# Patient Record
Sex: MALE | Race: WHITE | NOT HISPANIC OR LATINO | Employment: FULL TIME | URBAN - NONMETROPOLITAN AREA
[De-identification: names, ages, dates, MRNs, and addresses within clinical notes are randomized per-mention and may not be internally consistent; named-entity substitution may affect disease eponyms.]

---

## 2018-03-09 ENCOUNTER — OFFICE VISIT CONVERTED (OUTPATIENT)
Dept: FAMILY MEDICINE CLINIC | Age: 53
End: 2018-03-09
Attending: NURSE PRACTITIONER

## 2018-04-30 ENCOUNTER — OFFICE VISIT CONVERTED (OUTPATIENT)
Dept: FAMILY MEDICINE CLINIC | Age: 53
End: 2018-04-30
Attending: NURSE PRACTITIONER

## 2018-12-21 ENCOUNTER — OFFICE VISIT CONVERTED (OUTPATIENT)
Dept: FAMILY MEDICINE CLINIC | Age: 53
End: 2018-12-21
Attending: NURSE PRACTITIONER

## 2021-05-18 NOTE — PROGRESS NOTES
Magdaleno Pisano. 1965     Office/Outpatient Visit    Visit Date: Fri, Dec 21, 2018 08:27 am    Provider: Marjan Hernandez N.P. (Assistant: Terrie Dukes MA)    Location: [facility]        Electronically signed by Marjan Hernandez N.P. on  12/21/2018 09:55:45 AM                             SUBJECTIVE:        CC:     Mr. Pisano is a 53 year old White male.  This is a follow-up visit.  check up         HPI:         Patient complains of health checkup.  He cannot recall when he last had a physical exam.  His last ECG was managed by [facility] office.  Depression screening is positive for decreased ability to concentrate.  He is current with his Hep a at work last year immunization.      Smoking status: Former smoker         In regard to the hypertension, this was first diagnosed several years ago.  He is not using any nonpharmacologic treatment modalities.  His current cardiac medication regimen includes a diuretic, a calcium channel blocker, and an angiotensin receptor blocker.  Mr. Pisano does not check his blood pressure other than at his clinic appointments.  He is tolerating the medication well without side effects.  Compliance with treatment has been good.          Additionally, he presents with history of high cholesterol.  compliance with treatment has been good.  He specifically denies associated symptoms, including muscle pain and weakness.  managed typically by IVEL[facility] Tim cardiology         Additionally, he presents with history of back pain.  reason for visit: Other details: Magdaleno was recently sent to PT for his ongoing back pain - and reports that he is much better and is using the stretches that he has been taught and it solves much of his pain and problems - no current back pain complaints.          Regarding Alcohol use, Magdaleno typically drinks daily.  He drinks beer and drinks at least 6 per day.  He denies any symptoms when he quits drinking.  He does not have any desire to quit at  this time     ROS:     CONSTITUTIONAL:  Negative for chills, fatigue, fever and weight change.      EYES:  Positive for blurred vision ( bilaterally ) and has not had eye exam.      E/N/T:  Positive for diminished hearing and dentures upper and lower.   Negative for tooth pain.      CARDIOVASCULAR:  Positive for pedal edema.   Negative for chest pain, orthopnea or paroxysmal nocturnal dyspnea.      RESPIRATORY:  Negative for recent cough and dyspnea.      GASTROINTESTINAL:  Negative for abdominal pain, holly-colored stools, constipation and diarrhea.      GENITOURINARY:  Negative for change in urine stream.      MUSCULOSKELETAL:  Positive for back pain ( intermittent but improved after PT ).      INTEGUMENTARY:  Negative for atypical mole(s).      NEUROLOGICAL:  Negative for dizziness and headaches.      HEMATOLOGIC/LYMPHATIC:  Negative for easy bruising and excessive bleeding.      ALLERGIC/IMMUNOLOGIC:  Positive for seasonal allergies.      PSYCHIATRIC:  Negative for anxiety, crying spells, depression, feelings of stress, anhedonia, sadness, sleep disturbance and suicidal thoughts.          PMH/FMH/SH:     Last Reviewed on 2018 08:48 AM by Marjan Hernandez    Past Medical History:             PAST MEDICAL HISTORY             CURRENT MEDICAL PROVIDERS:    A primary care provider    Cardiologist: Roly Tim - annual exams         PREVENTIVE HEALTH MAINTENANCE             EVALUATION FOR AORTIC ANEURYSM: has never been done and has no medical need for one at this time     COLORECTAL CANCER SCREENING: The next colonoscopy is due  NOW     DENTAL CLEANING: has full dentures     EYE EXAM: unknown     Hepatitis C Medicare Screening: never (ordered 18)     PSA: has never been done and has no medical need for one at this time         Surgical History:         Procedures:    heart stent placed in          Family History:     Father: ;  COPD     Mother: Aortic Aneurysm     Brother(s): Gout      Sister(s): Breast Cancer     Paternal Grandmother: Breast Cancer         Social History:     Occupation: [employer]     Marital Status:      Children: 2 children         Tobacco/Alcohol/Supplements:     Last Reviewed on 12/21/2018 08:48 AM by Marjan Hernandez    Tobacco: He has a past history of cigarette smoking; quit date:  1998.          Alcohol: Frequency: Daily When he drinks, the average quantity of alcohol is 6 drinks.   He typically consumes beer.      Caffeine:  He admits to consuming caffeine via coffee ( 1 serving per day ) and soda ( 1 serving per day ).          Substance Abuse History:     Last Reviewed on 12/21/2018 08:48 AM by Marjan Hernandez    None         Mental Health History:     Last Reviewed on 12/21/2018 08:48 AM by Marjan Hernandez    NEGATIVE         Communicable Diseases (eg STDs):     Last Reviewed on 12/21/2018 08:48 AM by Marjan Hernandez    Reportable health conditions; NEGATIVE             Current Problems:     Last Reviewed on 12/21/2018 08:48 AM by Marjan Hernandez    Screening for cancer of colon     Back pain     High cholesterol     Gout, unspecified     Hypertension     Screening test for viral diseases - other     Alcohol abuse, continuous         Immunizations:     Fluzone Quadrivalent (3+ years) 12/21/2018     Influenza Virus Vaccine pf (adult) 11/15/2017         Allergies:     Last Reviewed on 12/21/2018 08:48 AM by Marjan Hernandez        Current Medications:     Last Reviewed on 12/21/2018 08:48 AM by Marjan Hernandez    Valsartan/Hydrochlorothiazide 160mg/12.5mg Tablet     Meloxicam 15mg Tablet 1 po QD with food     Aspirin (ASA) 81mg Tablets, Enteric Coated 1 tab daily     Atorvastatin Calcium 80mg Tablet 1 tab daily         OBJECTIVE:        Vitals:         Current: 12/21/2018 8:32:59 AM    Ht:  6 ft, 2 in;  Wt: 220.8 lbs;  BMI: 28.3    T: 98.3 F (oral);  BP: 163/81 mm Hg (left arm, sitting);  P: 62 bpm (left arm (BP Cuff), sitting);  sCr:  0.74 mg/dL;  GFR: 125.34        Repeat:     8:46:46 AM     BP:   138/82mm Hg (left arm, sitting)         Exams:     PHYSICAL EXAM:     GENERAL: Vitals recorded well developed, well nourished;  well groomed;  no apparent distress;     EYES: lids and lacrimal system are normal in appearance; extraocular movements intact; conjunctiva and cornea are normal; PERRLA;     E/N/T:  normal EACs, TMs, nasal/oral mucosa, teeth, gingiva, and oropharynx;     NECK: carotid exam is normal with good upstroke and no bruits;     RESPIRATORY: normal respiratory rate and pattern with no distress; normal breath sounds with no rales, rhonchi, wheezes or rubs;     CARDIOVASCULAR: normal rate; rhythm is regular;  trace pedal edema;     GASTROINTESTINAL: nontender, nondistended; no hepatosplenomegaly or masses; no bruits;     LYMPHATIC: no enlargement of cervical or facial nodes; no supraclavicular nodes;     SKIN:  no significant rashes or lesions; no suspicious moles;     MUSCULOSKELETAL:  Normal range of motion, strength and tone;     NEUROLOGICAL:  cranial nerves, motor and sensory function, reflexes, gait and coordination are all intact;     PSYCHIATRIC:  appropriate affect and demeanor; normal speech pattern; grossly normal memory;         Procedures:     Vaccination against other viral diseases, Influenza     1. Influenza, seasonal PF (children 3 years to adult): 0.5 ml unit dose given IM in the left upper arm; administered by  Regarding contraindications to an Influenza vaccine: Denies moderate/severe illness with/without fever; serious reaction to eggs, egg proteins, gentamicin, gelatin, arginine, neomycin or polymixin; serious reaction after recieving previous influenza vaccines; and history of Guillain-[place] Syndrome.              ASSESSMENT:           V70.0   Z00.00  Health checkup              DDx:     401.1   I10  Hypertension              DDx:     272.0   E78.2  High cholesterol              DDx:     724.5   M54.89  Back  pain              DDx:     305.01   F10.10  Alcohol abuse, continuous              DDx:     V73.89   Z11.59  Screening test for viral diseases - other              DDx:     V76.51   Z12.11  Screening for cancer of colon              DDx:     V04.81   Z23  Vaccination against other viral diseases, Influenza              DDx:         ORDERS:         Meds Prescribed:       Refill of: Meloxicam 15mg Tablet 1 tab PRN daily  #30 (Thirty) tablet(s) Refills: 2         Lab Orders:       48089  HTNLP - [facility] CMP AND LIPID: [id number], 01705  (Send-Out)         94220  B12FO - [facility] Vitamin B12 with Folate  (Send-Out)         47739  MG - [facility] Magnesium, Serum  (Send-Out)         98413  HPCAB - [facility] Hepatitis C antibody  (Send-Out)         95782  BDCB2 - [facility] CBC w/o diff  (Send-Out)           Procedures Ordered:       94432  Immunization administration; one vaccine  (In-House)         REFER  Referral to Specialist or Other Facility  (Send-Out)           Other Orders:       53907  Influenza virus vaccine, quadrivalent, split virus, preservative free 3 years of age & older  (In-House)           Depression screen negative  (In-House)         1101F  Pt screen for fall risk; document no falls in past year or only 1 fall w/o injury in past year (SOLOMON)  (In-House)           Negative EtOH screen  (In-House)                   PLAN:          Health checkup Will forward labs to [facility] office when available     MIPS PHQ-9 Depression Screening Completed form scanned and in chart; Total Score 3 Positive alcohol screen discussed minimal use, no concerns at this time.            Orders:         Depression screen negative  (In-House)         1101F  Pt screen for fall risk; document no falls in past year or only 1 fall w/o injury in past year (SOLOMON)  (In-House)           Negative EtOH screen  (In-House)             Patient Education Handouts:       Physical Exam 50-59 year, Male           Hypertension managed per Roly Tim  ok  to fill medication if needed until his follow up appt with them     LABORATORY:  Labs ordered to be performed today include HTN/Lipid Panel: CMP, Lipid.            Orders:       45771  HTNLP - [facility] CMP AND LIPID: [id number], 09697  (Send-Out)            High cholesterol Refills per Roly AMIN to fill if necessary until seen by them next month          Back pain           Prescriptions:       Refill of: Meloxicam 15mg Tablet 1 tab PRN daily  #30 (Thirty) tablet(s) Refills: 2          Alcohol abuse, continuous     LABORATORY:  Labs ordered to be performed today include B12 with Folate, CBC W/O DIFF, and Magnesium level.            Orders:       53425  B12FO - [facility] Vitamin B12 with Folate  (Send-Out)         58113  MG - [facility] Magnesium, Serum  (Send-Out)         11265  BDCB2 - [facility] CBC w/o diff  (Send-Out)            Screening test for viral diseases - other     LABORATORY:  Labs ordered to be performed today include Hepatitis C Antibody.            Orders:       28896  HPCAB - [facility] Hepatitis C antibody  (Send-Out)            Screening for cancer of colon         REFERRALS:  Referral initiated to a general surgeon ( Dr. Tomasz Irwin; a colonoscopy ).            Orders:       REFER  Referral to Specialist or Other Facility  (Send-Out)            Vaccination against other viral diseases, Influenza           Orders:       41075  Immunization administration; one vaccine  (In-House)         98886  Influenza virus vaccine, quadrivalent, split virus, preservative free 3 years of age & older  (In-House)               CHARGE CAPTURE:           Primary Diagnosis:     V70.0 Health checkup            Z00.00    Encounter for general adult medical examination without abnormal findings              Orders:          25331   Preventive medicine, established patient, age 40-64 years  (In-House)                Depression screen negative  (In-House)             1101F   Pt screen for fall risk; document no falls in past year or only 1  fall w/o injury in past year (SOLOMON)  (In-House)                Negative EtOH screen  (In-House)           401.1 Hypertension            I10    Essential (primary) hypertension              Orders:          53475 -25  Office/outpatient visit; established patient, level 3  (In-House)           272.0 High cholesterol            E78.2    Mixed hyperlipidemia    724.5 Back pain            M54.89    Other dorsalgia    305.01 Alcohol abuse, continuous            F10.10    Alcohol abuse, uncomplicated    V73.89 Screening test for viral diseases - other            Z11.59    Encounter for screening for other viral diseases    V76.51 Screening for cancer of colon            Z12.11    Encounter for screening for malignant neoplasm of colon    V04.81 Vaccination against other viral diseases, Influenza            Z23    Encounter for immunization              Orders:          08904   Immunization administration; one vaccine  (In-House)             02179   Influenza virus vaccine, quadrivalent, split virus, preservative free 3 years of age & older  (In-House)               ADDENDUMS:      ____________________________________    Addendum: 12/21/2018 10:00 AM - Mary Ann Santos         Visit Note Faxed to:        Tomasz Irwin  (General Surgery); Number [phone]

## 2021-05-18 NOTE — PROGRESS NOTES
Magdaleno Pisano 1965     Office/Outpatient Visit    Visit Date:  03:23 pm    Provider: Marjan Hernandez N.P. (Assistant: Emma Vincent)    Location: [facility]        Electronically signed by Marjan Hernandez N.P. on  2018 10:46:09 AM                             SUBJECTIVE:        CC:     Mr. Pisano is a 52 year old White male.  pain upper back R sided         HPI:         Upper back pain noted.  Reason for visit: Pain.  The discomfort is most prominent in the right, mid thoracic spine.  The pain does not radiate.  He characterizes it as constant, mild in severity, and cramping.  This is a chronic, but intermittent problem with an acute exacerbation.  The event which precipitated this pain was job-related repetitive lifting.  Aggravating factors contributing to the back pain may be job-related repetitive lifting with back strain.  Associated symptoms include stiffness.  He notes some pain relief with muscle relaxers.      ROS:     CONSTITUTIONAL:  Negative for chills, fatigue, fever and weight change.      CARDIOVASCULAR:  Negative for chest pain, orthopnea, paroxysmal nocturnal dyspnea and pedal edema.      RESPIRATORY:  Negative for dyspnea and cough.      GASTROINTESTINAL:  Negative for abdominal pain, heartburn, constipation, diarrhea, and stool changes.      MUSCULOSKELETAL:  Positive for back pain ( recurrent ).          PM/FM/:     Last Reviewed on 2017 09:48 AM by Marjan Hernandez    Past Medical History:             PAST MEDICAL HISTORY             CURRENT MEDICAL PROVIDERS:    Primary care provider ( Dr. Weldon )    Cardiologist: Roly Tim - annual exams         PREVENTIVE HEALTH MAINTENANCE             COLONOSCOPY: The next one is due  NOW     INFLUENZA VACCINE: has never been done declines     PSA: unknown         Surgical History:         Procedures:    heart stent placed in          Family History:     Father: ;  COPD     Mother:  Aortic Aneurysm     Brother(s): Gout     Sister(s): Breast Cancer     Paternal Grandmother: Breast Cancer         Social History:     Occupation: [employer]     Marital Status:      Children: 2 children         Tobacco/Alcohol/Supplements:     Last Reviewed on 4/30/2018 03:26 PM by Emma Vincent    Tobacco: He has a past history of cigarette smoking; quit date:  1998.          Alcohol: Frequency: Daily When he drinks, the average quantity of alcohol is 6 drinks.   He typically consumes beer.      Caffeine:  He admits to consuming caffeine via coffee ( 1 serving per day ) and soda ( 1 serving per day ).          Substance Abuse History:     Last Reviewed on 7/12/2017 09:48 AM by Marjan Hernandez    None         Mental Health History:     Last Reviewed on 7/12/2017 09:48 AM by Marjan Hernandez    NEGATIVE         Communicable Diseases (eg STDs):     Last Reviewed on 7/12/2017 09:48 AM by Marjan Hernandez    Reportable health conditions; NEGATIVE             Current Problems:     Last Reviewed on 5/01/2018 10:45 AM by Marjan Hernandez    Back pain     Microscopic hematuria     High cholesterol     Gout, unspecified     Hypertension     Upper back pain         Immunizations:     Influenza Virus Vaccine pf (adult) 11/15/2017         Allergies:     Last Reviewed on 4/30/2018 03:25 PM by Emma Vincent        Current Medications:     Last Reviewed on 4/30/2018 03:28 PM by Emma Vincent    Meloxicam 15mg Tablet 1 po QD with food     Amlodipine  2.5mg Tablet 1 tab daily     Aspirin (ASA) 81mg Tablets, Enteric Coated 1 tab daily     Atorvastatin Calcium 80mg Tablet 1 tab daily     Valsartan/Hydrochlorothiazide 160mg/12.5mg Tablet Take 1 tablet(s) by mouth daily         OBJECTIVE:        Vitals:         Current: 4/30/2018 3:25:25 PM    Ht:  6 ft, 2 in;  Wt: 226.3 lbs;  BMI: 29.1    T: 97.6 F (oral);  BP: 151/95 mm Hg (left arm, sitting);  P: 98 bpm (left arm (BP Cuff), sitting);  sCr: 0.74 mg/dL;  GFR:  128.09        Repeat:     3:31:44 PM     BP:   148/88mm Hg (left arm, sitting)         Exams:     PHYSICAL EXAM:     GENERAL: Vitals recorded well developed, well nourished;  well groomed;  no apparent distress;     NECK:  supple, full ROM; no thyromegaly; no carotid bruits;     RESPIRATORY: normal respiratory rate and pattern with no distress; normal breath sounds with no rales, rhonchi, wheezes or rubs;     CARDIOVASCULAR: normal rate; rhythm is regular;  normal S1; normal S2; no systolic murmur; no cyanosis; no edema;     GASTROINTESTINAL: nontender, nondistended; no hepatosplenomegaly or masses; no bruits;     MUSCULOSKELETAL: normal gait; normal range of motion of all major muscle groups; pain with range of motion in: back extension and left lateral flexion; tender to palpation over mid right thoracic area;     NEUROLOGICAL:  cranial nerves, motor and sensory function, reflexes, gait and coordination are all intact;     PSYCHIATRIC:  appropriate affect and demeanor; normal speech pattern; grossly normal memory;         ASSESSMENT:           724.1   M62.830  Upper back pain              DDx:         ORDERS:         Meds Prescribed:       Cyclobenzaprine HCl 10mg Tablet 1 tablet AT HS PRN  #20 (Twenty) tablet(s) Refills: 0       Refill of: Meloxicam 15mg Tablet 1 po QD with food  #30 (Thirty) tablet(s) Refills: 2         Procedures Ordered:       REFER  Referral to Specialist or Other Facility  (Send-Out)                   PLAN:          Upper back pain         REFERRALS:  Referral initiated to physical therapy ( [facility] ).            Prescriptions:       Cyclobenzaprine HCl 10mg Tablet 1 tablet AT HS PRN  #20 (Twenty) tablet(s) Refills: 0       Refill of: Meloxicam 15mg Tablet 1 po QD with food  #30 (Thirty) tablet(s) Refills: 2           Orders:       REFER  Referral to Specialist or Other Facility  (Send-Out)               CHARGE CAPTURE:           Primary Diagnosis:     724.1 Upper back pain            M62.830     Muscle spasm of back              Orders:          70773   Office/outpatient visit; established patient, level 2  (In-House)               ADDENDUMS:      ____________________________________    Addendum: 05/04/2018 12:40 JENNA - Marjan Hernandez         change office visit code to [id number]

## 2021-05-18 NOTE — PROGRESS NOTES
Magdaleno Pisano 1965     Office/Outpatient Visit    Visit Date: Fri, Mar 9, 2018 10:26 am    Provider: Marjan Hernandez N.P. (Assistant: Roxana Bo MA)    Location: [facility]        Electronically signed by Marjan Hernandez N.P. on  03/11/2018 08:27:44 PM                             SUBJECTIVE:        CC:     Mr. Pisano is a 52 year old White male.  Patient complains of sharp pain on the right side of his back after lifting heavy objects.          HPI:         Mr. Pisano presents with back pain.  The location is primarily in the lumbar spine.  It does not radiate.  He characterizes it as intermittent.  This is a chronic, but intermittent problem with an acute exacerbation.  He states that the current episode of pain started 4 days ago.  The event which precipitated this pain was bending over.  Aggravating factors contributing to the back pain may be lifting and bending over.  Associated symptoms include stiffness and radiating into his right buttock.  He denies fever, radicular arm pain, radicular leg pain, numbness in the arms, numbness in the legs, weakness in the arms or weakness of the legs.  He notes some pain relief with rest, ice, and heat.  The pain worsens with walking, back extension, and lying on his side.  Medical history is significant for previous back pain, no previous kidney stones.  He denies history of back surgery.      ROS:     CONSTITUTIONAL:  Negative for chills, fatigue and fever.      CARDIOVASCULAR:  Negative for chest pain and pedal edema.      RESPIRATORY:  Negative for recent cough and dyspnea.      GENITOURINARY:  Negative for dysuria, hematuria and change in urine stream.      MUSCULOSKELETAL:  Positive for back pain.      NEUROLOGICAL:  Negative for dizziness, fainting, headaches and paresthesias.          PM/FMH/SH:     Last Reviewed on 7/12/2017 09:48 AM by Marjan Hernandez    Past Medical History:             PAST MEDICAL HISTORY              CURRENT MEDICAL PROVIDERS:    Primary care provider ( Dr. Weldon )    Cardiologist: Roly Tim - annual exams         PREVENTIVE HEALTH MAINTENANCE             COLONOSCOPY: The next one is due  NOW     INFLUENZA VACCINE: has never been done declines     PSA: unknown         Surgical History:         Procedures:    heart stent placed in          Family History:     Father: ;  COPD     Mother: Aortic Aneurysm     Brother(s): Gout     Sister(s): Breast Cancer     Paternal Grandmother: Breast Cancer         Social History:     Occupation: Process machinery -      Marital Status:      Children: 2 children         Tobacco/Alcohol/Supplements:     Last Reviewed on 3/09/2018 10:28 AM by Roxana Bo    Tobacco: He has a past history of cigarette smoking; quit date:  .          Alcohol: Frequency: Daily When he drinks, the average quantity of alcohol is 6 drinks.   He typically consumes beer.      Caffeine:  He admits to consuming caffeine via coffee ( 1 serving per day ) and soda ( 1 serving per day ).          Substance Abuse History:     Last Reviewed on 2017 09:48 AM by Marjan Hernandez    None         Mental Health History:     Last Reviewed on 2017 09:48 AM by Marjan Hernandez    NEGATIVE         Communicable Diseases (eg STDs):     Last Reviewed on 2017 09:48 AM by Marjan Hernandez    Reportable health conditions; NEGATIVE             Current Problems:     Last Reviewed on 2017 09:48 AM by Marjan Hernandez    Back pain     Microscopic hematuria     High cholesterol     Gout, unspecified     Hypertension     Other abnormal laboratory result on blood         Immunizations:     Influenza Virus Vaccine pf (adult) 11/15/2017         Allergies:     Last Reviewed on 2017 09:54 AM by Roxana Bo        Current Medications:     Last Reviewed on 3/09/2018 10:30 AM by Roxana Bo    Meloxicam 15mg Tablet 1 po QD with food     Men's One A Day Multivitamin  Take one tablet once daily     Amlodipine  2.5mg Tablet 1 tab daily     Aspirin (ASA) 81mg Tablets, Enteric Coated 1 tab daily     Atorvastatin Calcium 80mg Tablet 1 tab daily     Full Spectrum CBD Tincture 1000mg Cannabidiol Oil prn     Lisinopril/Hydrochlorothiazide 20mg/12.5mg Tablet 1 bid         OBJECTIVE:        Vitals:         Current: 3/9/2018 10:28:01 AM    Ht:  6 ft, 2 in;  Wt: 225.6 lbs;  BMI: 29.0    T: 97.3 F (oral);  BP: 149/85 mm Hg (right arm, sitting);  P: 70 bpm (left arm (BP Cuff), sitting);  sCr: 0.74 mg/dL;  GFR: 127.92        Repeat:     10:50:28 AM     BP:   142/80mm Hg (left arm, sitting)         Exams:     PHYSICAL EXAM:     GENERAL: Vitals recorded well developed, well nourished;  no apparent distress;     NECK: range of motion is normal;     RESPIRATORY: normal appearance and symmetric expansion of chest wall; normal respiratory rate and pattern with no distress; normal breath sounds with no rales, rhonchi, wheezes or rubs;     CARDIOVASCULAR: normal rate; rhythm is regular;  no edema;     GASTROINTESTINAL: nontender; normal bowel sounds; no organomegaly;     LYMPHATIC: no enlargement of cervical or facial nodes; no supraclavicular nodes;     MUSCULOSKELETAL: normal gait; normal range of motion of all major muscle groups; pain with range of motion in: back extension and left lateral flexion;     NEUROLOGIC: mental status: alert and oriented x 3;     PSYCHIATRIC: appropriate affect and demeanor; normal speech pattern; normal thought and perception;         ASSESSMENT           724.5   M54.89  Back pain              DDx:         ORDERS:         Meds Prescribed:       Cyclobenzaprine HCl 10mg Tablet 1 tablet AT HS PRN  #20 (Twenty) tablet(s) Refills: 0         Lab Orders:       [id number]  [facility] Urinalysis, automated, with micro  (Send-Out; Stat)                   PLAN:          Back pain     LABORATORY:  Labs ordered to be performed today include urinalysis with micro.      RECOMMENDATIONS  given include: no evidence of stones with todays labs, I do feel this is probably a pulled muscle - I would recommend that he rest his back over the next few weeks  take the mobic as prescribed and the muscle relaxers - TAKE WITH CAUTION as may cause drowsiness-  RTO if no better in 6-8 weeks.            Prescriptions:       Cyclobenzaprine HCl 10mg Tablet 1 tablet AT HS PRN  #20 (Twenty) tablet(s) Refills: 0           Orders:       [id number]  [facility] Urinalysis, automated, with micro  (Send-Out; Stat)               Patient Recommendations:        For  Back pain:     I also recommend no evidence of stones with todays labs, I do feel this is probably a pulled muscle - I would recommend that he rest his back over the next few weeks  take the mobic as prescribed and the muscle relaxers - TAKE WITH CAUTION as may cause drowsiness-  RTO if no better in 6-8 weeks.              CHARGE CAPTURE           **Please note: ICD descriptions below are intended for billing purposes only and may not represent clinical diagnoses**        Primary Diagnosis:         724.5 Back pain            M54.89    Other dorsalgia              Orders:          [id number]   Office/outpatient visit; established patient, level 3  (In-House)

## 2021-07-01 VITALS
TEMPERATURE: 97.6 F | BODY MASS INDEX: 29.04 KG/M2 | DIASTOLIC BLOOD PRESSURE: 88 MMHG | SYSTOLIC BLOOD PRESSURE: 148 MMHG | HEIGHT: 74 IN | WEIGHT: 226.3 LBS | HEART RATE: 98 BPM

## 2021-07-01 VITALS
HEART RATE: 70 BPM | DIASTOLIC BLOOD PRESSURE: 80 MMHG | TEMPERATURE: 97.3 F | BODY MASS INDEX: 28.95 KG/M2 | WEIGHT: 225.6 LBS | SYSTOLIC BLOOD PRESSURE: 142 MMHG | HEIGHT: 74 IN

## 2021-07-01 VITALS
DIASTOLIC BLOOD PRESSURE: 82 MMHG | WEIGHT: 220.8 LBS | SYSTOLIC BLOOD PRESSURE: 138 MMHG | BODY MASS INDEX: 28.34 KG/M2 | TEMPERATURE: 98.3 F | HEIGHT: 74 IN | HEART RATE: 62 BPM

## 2021-12-07 RX ORDER — MELOXICAM 15 MG/1
TABLET ORAL
Qty: 30 TABLET | Refills: 2 | OUTPATIENT
Start: 2021-12-07

## 2022-01-24 ENCOUNTER — TELEPHONE (OUTPATIENT)
Dept: PEDIATRICS | Facility: OTHER | Age: 57
End: 2022-01-24

## 2022-01-24 NOTE — TELEPHONE ENCOUNTER
Caller: Ana Pisano    Relationship to patient: Emergency Contact    Best call back number: [phone]    Chief complaint: RIB PAIN- BROKEN RIB    Patient directed to call 911 or go to their nearest emergency room.     Patient verbalized understanding: [x] Yes  [] No  If no, why?    Additional notes:THE PATIENT'S WIFE STATED THE PATIENT FELL ON 01/22/22 AND BELIEVES HE HAS BROKEN HIS RIB. SHE WILL BE TAKING HIM TO [facility].

## 2022-01-31 ENCOUNTER — TELEPHONE (OUTPATIENT)
Dept: FAMILY MEDICINE CLINIC | Age: 57
End: 2022-01-31

## 2022-01-31 ENCOUNTER — OFFICE VISIT (OUTPATIENT)
Dept: FAMILY MEDICINE CLINIC | Age: 57
End: 2022-01-31

## 2022-01-31 VITALS
SYSTOLIC BLOOD PRESSURE: 153 MMHG | HEIGHT: 74 IN | HEART RATE: 73 BPM | BODY MASS INDEX: 29.34 KG/M2 | TEMPERATURE: 98 F | OXYGEN SATURATION: 97 % | DIASTOLIC BLOOD PRESSURE: 75 MMHG | WEIGHT: 228.6 LBS

## 2022-01-31 DIAGNOSIS — S22.41XA: Primary | ICD-10-CM

## 2022-01-31 DIAGNOSIS — I10 PRIMARY HYPERTENSION: ICD-10-CM

## 2022-01-31 PROBLEM — Z95.5 PRESENCE OF CORONARY ANGIOPLASTY IMPLANT AND GRAFT: Status: ACTIVE | Noted: 2017-11-27

## 2022-01-31 PROBLEM — S22.49XA RIB FRACTURES: Status: ACTIVE | Noted: 2022-01-31

## 2022-01-31 PROCEDURE — 99213 OFFICE O/P EST LOW 20 MIN: CPT | Performed by: NURSE PRACTITIONER

## 2022-01-31 RX ORDER — AMLODIPINE BESYLATE 5 MG/1
5 TABLET ORAL DAILY
COMMUNITY
End: 2022-03-04 | Stop reason: SDUPTHER

## 2022-01-31 RX ORDER — LOSARTAN POTASSIUM AND HYDROCHLOROTHIAZIDE 12.5; 1 MG/1; MG/1
1 TABLET ORAL DAILY
COMMUNITY
Start: 2021-12-17 | End: 2022-12-17

## 2022-01-31 RX ORDER — HYDROCHLOROTHIAZIDE 12.5 MG/1
12.5 TABLET ORAL DAILY
COMMUNITY

## 2022-01-31 NOTE — PROGRESS NOTES
Chief Complaint  Magdaleno Pisano presents to [facility] for Hospital Follow Up Visit (1/24 fractured ribs)    Subjective          Mr. Pisano presents for follow-up after hospital visit for fractured ribs after fall. Fall occurred last Saturday (12 days ago), had been moving furniture off of a truck at home and fell. Immediately felt pain, CXR showed fracture of two right ribs. Hospital did xray, gave him Mackay. Pt reports he is still taking the pain medication they prescribed him, takes it once daily. He has two pills left, then will transition to OTC anti-inflammatories. He reports pain is improved, but still difficult to get out of bed, up out of chair, etc.    BP elevated today, follows up with Roly Tim.  Unsure what medication taking - has had BP medication today     He is needing to be off work secondary to his job duties.  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 He is needing FMLA/ diability papers completed        Review of Systems      No Known Allergies   Past Medical History:   Diagnosis Date   • Hyperlipidemia    • Hypertension      Current Outpatient Medications   Medication Sig Dispense Refill   • amLODIPine (NORVASC) 5 MG tablet Take 5 mg by mouth Daily.     • aspirin 81 MG chewable tablet Chew 81 mg Daily.     • atorvastatin (LIPITOR) 80 MG tablet Take 80 mg by mouth Daily.     • hydroCHLOROthiazide (HYDRODIURIL) 12.5 MG tablet Take 12.5 mg by mouth Daily.     • HYDROcodone-acetaminophen (NORCO) 7.5-325 MG per tablet Take 1 po q 4-6 hours prn 20 tablet 0   • losartan-hydrochlorothiazide (HYZAAR) 100-12.5 MG per tablet Take 1 tablet by mouth Daily.       No current facility-administered medications for this visit.     No past surgical history on file.   Social History     Tobacco Use   • Smoking status: Former Smoker     Packs/day: 0.50     Years: 0.00     Pack years: 0.00     Types: Cigarettes  "    Start date: 1981     Quit date: 1995     Years since quittin.6   • Smokeless tobacco: Never Used   Vaping Use   • Vaping Use: Never used   Substance Use Topics   • Alcohol use: Yes     Alcohol/week: 28.0 standard drinks     Types: 28 Cans of beer per week   • Drug use: No     Family History   Problem Relation Age of Onset   • No Known Problems Mother    • No Known Problems Father      Health Maintenance Due   Topic Date Due   • COLORECTAL CANCER SCREENING  Never done   • ANNUAL PHYSICAL  Never done   • TDAP/TD VACCINES (1 - Tdap) Never done   • ZOSTER VACCINE (1 of 2) Never done   • HEPATITIS C SCREENING  Never done      Immunization History   Administered Date(s) Administered   • COVID-19 (MODERNA) 1st, 2nd, 3rd Dose Only 2021, 2021   • Flu Vaccine Quad PF >36MO 2020   • Influenza Quad Vaccine (Inpatient) 11/15/2017   • Influenza TIV (IM) 10/05/2021        Objective     Vitals:    22 0908   BP: 153/75   BP Location: Left arm   Patient Position: Sitting   Cuff Size: Large Adult   Pulse: 73   Temp: 98 °F (36.7 °C)   TempSrc: Oral   SpO2: 97%   Weight: 104 kg (228 lb 9.6 oz)   Height: 188 cm (74\")     Body mass index is 29.35 kg/m².     Physical Exam  Vitals reviewed.   Constitutional:       General: He is in acute distress (pain, minimal / moderate depending on movement ).      Appearance: Normal appearance.   HENT:      Head: Normocephalic.   Eyes:      Pupils: Pupils are equal, round, and reactive to light.   Cardiovascular:      Rate and Rhythm: Normal rate and regular rhythm.      Heart sounds: No murmur heard.      Pulmonary:      Effort: Pulmonary effort is normal.      Breath sounds: Normal breath sounds.   Chest:      Chest wall: Tenderness (right chest wall) present.   Musculoskeletal:         General: Normal range of motion.   Neurological:      Mental Status: He is alert.   Psychiatric:         Mood and Affect: Mood normal.         Behavior: Behavior normal. "           Result Review :                               Assessment and Plan      Diagnoses and all orders for this visit:    1. Closed traumatic displaced fracture of two ribs of right side, initial encounter (Primary)  Comments:  I will anticipate 4 weeks  (RTW March 1, 2022)- with re-check of CXR in 2 weeks.  may need extension based on CXR results    2. Primary hypertension  Comments:  will call to verify medication that he is taking for his blood pressure               Follow Up     Return in about 2 weeks (around 2/14/2022) for Recheck cxr ribs.

## 2022-01-31 NOTE — PROGRESS NOTES
Subjective   Magdaleno Pisano is a 56 y.o. male.     .      {Common H&P Review Areas:86529}    Review of Systems    Objective   Physical Exam    Assessment/Plan   Diagnoses and all orders for this visit:    1. Primary hypertension (Primary)

## 2022-02-08 ENCOUNTER — TELEPHONE (OUTPATIENT)
Dept: FAMILY MEDICINE CLINIC | Age: 57
End: 2022-02-08

## 2022-02-08 NOTE — TELEPHONE ENCOUNTER
If his symptoms are worsening / not improving, he should be seen, but will need to see him prior to him returning to work - end of this month  or sooner if his symptoms are worsening

## 2022-02-08 NOTE — TELEPHONE ENCOUNTER
Caller: Magdaleno Pisano    Relationship to patient: Self    Best call back number: [phone]     Patient is needing: THE PATIENT HAS CALLED STATING HE WAS SEEN ON 01/31 FOR A HOSP. FOLLOW UP WITH LAINEY MARTINEZ.  PATIENT IS REQUESTING TO KNOW IF MS. MARTINEZ WOULD LIKE TO SEE HIM AGAIN FOR ANOTHER FOLLOW UP.

## 2022-02-22 DIAGNOSIS — S22.41XA: Primary | ICD-10-CM

## 2022-02-22 NOTE — TELEPHONE ENCOUNTER
HUB WAS UNABLE TO WARM TRANSFER TO SEE IF XRAY ORDER PLACES      Caller: Magdaleno Pisano    Relationship: Self    Best call back number: [phone]    What is the best time to reach you: ANYTIME     Who are you requesting to speak with (clinical staff, provider,  specific staff member): CLINICAL         What was the call regarding: PATIENT IS CALLING MAKE APPOINTMENT FOR XRAY, AND IF ORDER WAS PLACED     Do you require a callback: YES, PLEASE

## 2022-02-28 ENCOUNTER — HOSPITAL ENCOUNTER (OUTPATIENT)
Dept: GENERAL RADIOLOGY | Facility: HOSPITAL | Age: 57
Discharge: HOME | End: 2022-02-28
Admitting: NURSE PRACTITIONER

## 2022-02-28 DIAGNOSIS — S22.41XA: ICD-10-CM

## 2022-02-28 PROCEDURE — 71101 X-RAY EXAM UNILAT RIBS/CHEST: CPT

## 2022-03-04 ENCOUNTER — TELEPHONE (OUTPATIENT)
Dept: FAMILY MEDICINE CLINIC | Age: 57
End: 2022-03-04

## 2022-03-04 ENCOUNTER — OFFICE VISIT (OUTPATIENT)
Dept: FAMILY MEDICINE CLINIC | Age: 57
End: 2022-03-04

## 2022-03-04 VITALS
HEART RATE: 110 BPM | BODY MASS INDEX: 29.26 KG/M2 | WEIGHT: 228 LBS | DIASTOLIC BLOOD PRESSURE: 90 MMHG | OXYGEN SATURATION: 98 % | HEIGHT: 74 IN | SYSTOLIC BLOOD PRESSURE: 162 MMHG | TEMPERATURE: 98.2 F

## 2022-03-04 DIAGNOSIS — S22.41XK: Primary | ICD-10-CM

## 2022-03-04 DIAGNOSIS — I10 PRIMARY HYPERTENSION: ICD-10-CM

## 2022-03-04 PROCEDURE — 99214 OFFICE O/P EST MOD 30 MIN: CPT | Performed by: NURSE PRACTITIONER

## 2022-03-04 RX ORDER — AMLODIPINE BESYLATE 5 MG/1
7.5 TABLET ORAL DAILY
Qty: 135 TABLET | Refills: 1 | Status: SHIPPED | OUTPATIENT
Start: 2022-03-04 | End: 2022-03-04 | Stop reason: SDUPTHER

## 2022-03-04 RX ORDER — AMLODIPINE BESYLATE 5 MG/1
7.5 TABLET ORAL DAILY
Qty: 135 TABLET | Refills: 1 | Status: SHIPPED | OUTPATIENT
Start: 2022-03-04

## 2022-03-04 NOTE — TELEPHONE ENCOUNTER
Need to extend his FMLA/leave to RTW 3/21/2022  Dates updated and placed in your in basket to sign

## 2022-03-04 NOTE — PROGRESS NOTES
Chief Complaint  Magdaleno Pisano presents to [facility] for Follow-up (pt is f/u from broken ribs and is needing paperwork for work)    Subjective          Magdaleno is here for rib fracture sustained from fall  after falling off the back of a truck and hitting tailgate.  He is taking NSAIDs as needed  for now for the pain.  He has been off work since that time to allow for healing.  He continues to have pain in the right posterior.  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    He did have a repeat CXR to see if healing is appropriate, and xray  shows still with non union at this time.        Magdaleno;s blood pressure is quite elevated today.  He hastaken his medication today He takes amlodipine, lisinopril - HCTZ daily.  He is follwed by cardiology annually.  No symptoms today such as headace/chest pain/dizziness   .         Review of Systems      No Known Allergies   Past Medical History:   Diagnosis Date   • Hyperlipidemia    • Hypertension      Current Outpatient Medications   Medication Sig Dispense Refill   • amLODIPine (NORVASC) 5 MG tablet Take 1.5 tablets by mouth Daily. 135 tablet 1   • aspirin 81 MG chewable tablet Chew 81 mg Daily.     • atorvastatin (LIPITOR) 80 MG tablet Take 80 mg by mouth Daily.     • hydroCHLOROthiazide (HYDRODIURIL) 12.5 MG tablet Take 12.5 mg by mouth Daily.     • losartan-hydrochlorothiazide (HYZAAR) 100-12.5 MG per tablet Take 1 tablet by mouth Daily.       No current facility-administered medications for this visit.     No past surgical history on file.   Social History     Tobacco Use   • Smoking status: Former Smoker     Packs/day: 0.50     Years: 0.00     Pack years: 0.00     Types: Cigarettes     Start date: 1981     Quit date: 1995     Years since quittin.7   • Smokeless tobacco: Never Used  "  Vaping Use   • Vaping Use: Never used   Substance Use Topics   • Alcohol use: Yes     Alcohol/week: 28.0 standard drinks     Types: 28 Cans of beer per week   • Drug use: No     Family History   Problem Relation Age of Onset   • No Known Problems Mother    • No Known Problems Father      Health Maintenance Due   Topic Date Due   • COLORECTAL CANCER SCREENING  Never done   • ANNUAL PHYSICAL  Never done   • TDAP/TD VACCINES (1 - Tdap) Never done   • ZOSTER VACCINE (1 of 2) Never done   • HEPATITIS C SCREENING  Never done   • COVID-19 Vaccine (3 - Booster for Moderna series) 02/28/2022      Immunization History   Administered Date(s) Administered   • COVID-19 (MODERNA) 1st, 2nd, 3rd Dose Only 08/30/2021, 09/28/2021   • Flu Vaccine Quad PF >36MO 11/02/2020   • Influenza Quad Vaccine (Inpatient) 11/15/2017   • Influenza TIV (IM) 10/05/2021        Objective     Vitals:    03/04/22 1001 03/04/22 1005   BP: 161/88 162/90   BP Location: Left arm Right arm   Patient Position: Sitting Sitting   Cuff Size: Adult Adult   Pulse: 115 110   Temp: 98.2 °F (36.8 °C)    TempSrc: Oral    SpO2: 98%    Weight: 103 kg (228 lb)    Height: 188 cm (74\")      Body mass index is 29.27 kg/m².     Physical Exam  Vitals reviewed.   Constitutional:       Appearance: Normal appearance.   HENT:      Head: Normocephalic.   Eyes:      Pupils: Pupils are equal, round, and reactive to light.   Cardiovascular:      Rate and Rhythm: Normal rate and regular rhythm.      Heart sounds: No murmur heard.      Pulmonary:      Effort: Pulmonary effort is normal.      Breath sounds: Normal breath sounds.   Musculoskeletal:         General: Normal range of motion.      Thoracic back: Bony tenderness (right flank area ) present.   Neurological:      Mental Status: He is alert.   Psychiatric:         Mood and Affect: Mood normal.         Behavior: Behavior normal.           Result Review :                               Assessment and Plan      Diagnoses and all " orders for this visit:    1. 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 Primary hypertension  Comments:  Increase to 7.5 amlodipine , may be due to pain, but last 2 appt high - will make adjustments and recheck in 2 weeks  Orders:  -     amLODIPine (NORVASC) 5 MG tablet; Take 1.5 tablets by mouth Daily.  Dispense: 135 tablet; Refill: 1              Follow Up     Return in about 2 weeks (around 3/18/2022).

## 2022-03-10 ENCOUNTER — TELEPHONE (OUTPATIENT)
Dept: FAMILY MEDICINE CLINIC | Age: 57
End: 2022-03-10

## 2022-03-10 NOTE — TELEPHONE ENCOUNTER
Caller: Magdaleno Pisano    Relationship: Self    Best call back number: [phone]    PATIENT STATED:  What form or medical record are you requesting: SHORT TERM DISABILITY FORMS    Who is requesting this form or medical record from you: PATIENT    How would you like to receive the form or medical records (pick-up, mail, fax): FAX TO NUMBER ON FORMS    Timeframe paperwork needed: ASAP    Additional notes:  THAT EMPLOYER INFORMED THIS HAS NOT BEEN RECEIVED, SEEN IN OFFICE 3/4/22

## 2022-03-16 ENCOUNTER — OFFICE VISIT (OUTPATIENT)
Dept: FAMILY MEDICINE CLINIC | Age: 57
End: 2022-03-16

## 2022-03-16 VITALS
HEIGHT: 74 IN | BODY MASS INDEX: 29.39 KG/M2 | WEIGHT: 229 LBS | HEART RATE: 68 BPM | OXYGEN SATURATION: 98 % | SYSTOLIC BLOOD PRESSURE: 152 MMHG | DIASTOLIC BLOOD PRESSURE: 90 MMHG

## 2022-03-16 DIAGNOSIS — S22.41XK: ICD-10-CM

## 2022-03-16 DIAGNOSIS — I10 PRIMARY HYPERTENSION: Primary | ICD-10-CM

## 2022-03-16 PROCEDURE — 99214 OFFICE O/P EST MOD 30 MIN: CPT | Performed by: NURSE PRACTITIONER

## 2022-03-16 NOTE — PROGRESS NOTES
Chief Complaint  Magdaleno Pisano presents to [facility] for Hypertension (Follow up ) and Other (Rib Fracture )    Subjective          Magdaleno is here to follow up on recent right side rib fractures .  He reports that the pain is greatly improved, but continues to have to make a few adjustments to his position.  He is still off work and feels that he will be ok to return on Monday- .  He is needing his [facility] papers updated.        Magdaleno presents today for follow up on Hypertension- not well controlled.  Cardiac symptoms none.  Current medication / treatment includes amlodipine, HCTZ, Lisinopril  Cardiovascular risk factors: advanced age (older than 55 for men, 65 for women), dyslipidemia, hypertension and male gender  He does not check his blood pressures at home.  He reports that he just took his medication prior to coming to the office   He does continue to follow up with cardiology one time per year       .         Review of Systems      No Known Allergies   Past Medical History:   Diagnosis Date   • Hyperlipidemia    • Hypertension      Current Outpatient Medications   Medication Sig Dispense Refill   • amLODIPine (NORVASC) 5 MG tablet Take 1.5 tablets by mouth Daily. 135 tablet 1   • aspirin 81 MG chewable tablet Chew 81 mg Daily.     • atorvastatin (LIPITOR) 80 MG tablet Take 80 mg by mouth Daily.     • hydroCHLOROthiazide (HYDRODIURIL) 12.5 MG tablet Take 12.5 mg by mouth Daily.     • losartan-hydrochlorothiazide (HYZAAR) 100-12.5 MG per tablet Take 1 tablet by mouth Daily.       No current facility-administered medications for this visit.     History reviewed. No pertinent surgical history.   Social History     Tobacco Use   • Smoking status: Former Smoker     Packs/day: 0.50     Years: 0.00     Pack years: 0.00     Types: Cigarettes     Start date: 1981     Quit date: 1995     Years since quittin.8   • Smokeless tobacco: Never Used   Vaping Use   •  "Vaping Use: Never used   Substance Use Topics   • Alcohol use: Yes     Alcohol/week: 28.0 standard drinks     Types: 28 Cans of beer per week   • Drug use: No     Family History   Problem Relation Age of Onset   • No Known Problems Mother    • No Known Problems Father      Health Maintenance Due   Topic Date Due   • COLORECTAL CANCER SCREENING  Never done   • ANNUAL PHYSICAL  Never done   • TDAP/TD VACCINES (1 - Tdap) Never done   • ZOSTER VACCINE (1 of 2) Never done   • HEPATITIS C SCREENING  Never done   • COVID-19 Vaccine (3 - Booster for Moderna series) 02/28/2022      Immunization History   Administered Date(s) Administered   • COVID-19 (MODERNA) 1st, 2nd, 3rd Dose Only 08/30/2021, 09/28/2021   • Flu Vaccine Quad PF >36MO 11/02/2020   • Influenza Quad Vaccine (Inpatient) 11/15/2017   • Influenza TIV (IM) 10/05/2021        Objective     Vitals:    03/16/22 0759   BP: 152/90   BP Location: Left arm   Patient Position: Sitting   Cuff Size: Large Adult   Pulse: 68   SpO2: 98%   Weight: 104 kg (229 lb)   Height: 188 cm (74\")     Body mass index is 29.4 kg/m².     Physical Exam  Vitals reviewed.   Constitutional:       Appearance: Normal appearance.   HENT:      Head: Normocephalic.   Eyes:      Pupils: Pupils are equal, round, and reactive to light.   Cardiovascular:      Rate and Rhythm: Normal rate and regular rhythm.      Heart sounds: No murmur heard.  Pulmonary:      Effort: Pulmonary effort is normal.      Breath sounds: Normal breath sounds.   Musculoskeletal:         General: Normal range of motion.   Neurological:      Mental Status: He is alert.   Psychiatric:         Mood and Affect: Mood normal.         Behavior: Behavior normal.           Result Review :                               Assessment and Plan      Diagnoses and all orders for this visit:    1. Primary hypertension (Primary)  Comments:  Instructed to obtain BP cuff and monitor daily- If remains above 150/90 at home, RTO for medication changes  " otherwise, follow up in 3 months    2. 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  May return to work without restrictions March 21              Follow Up     Return in about 3 months (around 6/16/2022) for Annual physical.

## 2022-03-25 ENCOUNTER — TELEPHONE (OUTPATIENT)
Dept: FAMILY MEDICINE CLINIC | Age: 57
End: 2022-03-25

## 2022-03-25 NOTE — TELEPHONE ENCOUNTER
LVM returning pt wifes call. Front staff states pt needs FMLA extension and needs to be completed today. No forms received.

## 2022-06-14 ENCOUNTER — OFFICE VISIT (OUTPATIENT)
Dept: FAMILY MEDICINE CLINIC | Age: 57
End: 2022-06-14

## 2022-06-14 ENCOUNTER — LAB (OUTPATIENT)
Dept: LAB | Facility: HOSPITAL | Age: 57
End: 2022-06-14

## 2022-06-14 VITALS
WEIGHT: 229 LBS | SYSTOLIC BLOOD PRESSURE: 169 MMHG | HEART RATE: 86 BPM | DIASTOLIC BLOOD PRESSURE: 92 MMHG | BODY MASS INDEX: 29.4 KG/M2

## 2022-06-14 DIAGNOSIS — I10 ESSENTIAL HYPERTENSION: ICD-10-CM

## 2022-06-14 DIAGNOSIS — R35.0 URINARY FREQUENCY: Primary | ICD-10-CM

## 2022-06-14 DIAGNOSIS — N63.10 MASS OF RIGHT BREAST, UNSPECIFIED QUADRANT: ICD-10-CM

## 2022-06-14 DIAGNOSIS — R10.33 PERIUMBILICAL ABDOMINAL PAIN: ICD-10-CM

## 2022-06-14 LAB
ALBUMIN SERPL-MCNC: 4.7 G/DL (ref 3.5–5.2)
ALBUMIN/GLOB SERPL: 1.7 G/DL
ALP SERPL-CCNC: 81 U/L (ref 39–117)
ALT SERPL W P-5'-P-CCNC: 29 U/L (ref 1–41)
ANION GAP SERPL CALCULATED.3IONS-SCNC: 14.7 MMOL/L (ref 5–15)
AST SERPL-CCNC: 23 U/L (ref 1–40)
BACTERIA UR QL AUTO: ABNORMAL /HPF
BASOPHILS # BLD AUTO: 0.07 10*3/MM3 (ref 0–0.2)
BASOPHILS NFR BLD AUTO: 0.8 % (ref 0–1.5)
BILIRUB SERPL-MCNC: 0.7 MG/DL (ref 0–1.2)
BILIRUB UR QL STRIP: NEGATIVE
BUN SERPL-MCNC: 16 MG/DL (ref 6–20)
BUN/CREAT SERPL: 15.8 (ref 7–25)
CALCIUM SPEC-SCNC: 9.6 MG/DL (ref 8.6–10.5)
CHLORIDE SERPL-SCNC: 98 MMOL/L (ref 98–107)
CLARITY UR: CLEAR
CO2 SERPL-SCNC: 22.3 MMOL/L (ref 22–29)
COLOR UR: YELLOW
CREAT SERPL-MCNC: 1.01 MG/DL (ref 0.76–1.27)
DEPRECATED RDW RBC AUTO: 40.9 FL (ref 37–54)
EGFRCR SERPLBLD CKD-EPI 2021: 86.2 ML/MIN/1.73
EOSINOPHIL # BLD AUTO: 0.2 10*3/MM3 (ref 0–0.4)
EOSINOPHIL NFR BLD AUTO: 2.4 % (ref 0.3–6.2)
ERYTHROCYTE [DISTWIDTH] IN BLOOD BY AUTOMATED COUNT: 13.1 % (ref 12.3–15.4)
GLOBULIN UR ELPH-MCNC: 2.7 GM/DL
GLUCOSE SERPL-MCNC: 108 MG/DL (ref 65–99)
GLUCOSE UR STRIP-MCNC: NEGATIVE MG/DL
HCT VFR BLD AUTO: 43.5 % (ref 37.5–51)
HGB BLD-MCNC: 15 G/DL (ref 13–17.7)
HGB UR QL STRIP.AUTO: ABNORMAL
IMM GRANULOCYTES # BLD AUTO: 0.04 10*3/MM3 (ref 0–0.05)
IMM GRANULOCYTES NFR BLD AUTO: 0.5 % (ref 0–0.5)
KETONES UR QL STRIP: NEGATIVE
LEUKOCYTE ESTERASE UR QL STRIP.AUTO: NEGATIVE
LYMPHOCYTES # BLD AUTO: 2.26 10*3/MM3 (ref 0.7–3.1)
LYMPHOCYTES NFR BLD AUTO: 26.8 % (ref 19.6–45.3)
MCH RBC QN AUTO: 29.4 PG (ref 26.6–33)
MCHC RBC AUTO-ENTMCNC: 34.5 G/DL (ref 31.5–35.7)
MCV RBC AUTO: 85.1 FL (ref 79–97)
MONOCYTES # BLD AUTO: 0.98 10*3/MM3 (ref 0.1–0.9)
MONOCYTES NFR BLD AUTO: 11.6 % (ref 5–12)
NEUTROPHILS NFR BLD AUTO: 4.88 10*3/MM3 (ref 1.7–7)
NEUTROPHILS NFR BLD AUTO: 57.9 % (ref 42.7–76)
NITRITE UR QL STRIP: NEGATIVE
NRBC BLD AUTO-RTO: 0 /100 WBC (ref 0–0.2)
PH UR STRIP.AUTO: 6.5 [PH] (ref 5–8)
PLATELET # BLD AUTO: 227 10*3/MM3 (ref 140–450)
PMV BLD AUTO: 10.5 FL (ref 6–12)
POTASSIUM SERPL-SCNC: 3.8 MMOL/L (ref 3.5–5.2)
PROT SERPL-MCNC: 7.4 G/DL (ref 6–8.5)
PROT UR QL STRIP: NEGATIVE
RBC # BLD AUTO: 5.11 10*6/MM3 (ref 4.14–5.8)
RBC # UR STRIP: ABNORMAL /HPF
REF LAB TEST METHOD: ABNORMAL
SODIUM SERPL-SCNC: 135 MMOL/L (ref 136–145)
SP GR UR STRIP: >=1.03 (ref 1–1.03)
SQUAMOUS #/AREA URNS HPF: ABNORMAL /HPF
UROBILINOGEN UR QL STRIP: ABNORMAL
WBC # UR STRIP: ABNORMAL /HPF
WBC NRBC COR # BLD: 8.43 10*3/MM3 (ref 3.4–10.8)

## 2022-06-14 PROCEDURE — 80053 COMPREHEN METABOLIC PANEL: CPT

## 2022-06-14 PROCEDURE — 36415 COLL VENOUS BLD VENIPUNCTURE: CPT

## 2022-06-14 PROCEDURE — 85025 COMPLETE CBC W/AUTO DIFF WBC: CPT

## 2022-06-14 PROCEDURE — 99214 OFFICE O/P EST MOD 30 MIN: CPT | Performed by: NURSE PRACTITIONER

## 2022-06-14 PROCEDURE — 81001 URINALYSIS AUTO W/SCOPE: CPT | Performed by: NURSE PRACTITIONER

## 2022-06-14 NOTE — PROGRESS NOTES
Magdaleno Pisano presents to [facility].    Chief Complaint:  Pain ((R) side groin and breast pain x 1 month )         History of Present Illness:  bdominal Pain  This is a new problem. The current episode started 1 to 4 weeks ago. The onset quality is gradual. The problem occurs 2 to 4 times per day. The problem has been waxing and waning. The pain is located in the right flank. The pain is at a severity of 5/10. The quality of the pain is cramping. The abdominal pain radiates to the back and right flank. Associated symptoms include frequency. Pertinent negatives include no fever.   abd, worse with bending     PAST MEDICAL HISTORY :             CURRENT MEDICAL PROVIDERS:      Cardiologist: Roly Tim         PREVENTIVE HEALTH MAINTENANCE                 DENTAL CLEANING: has full dentures       Hepatitis C Medicare Screening:  (negative 18)           Surgical History:         Procedures:    heart stent placed in          Family History:     Father: ;  COPD     Mother: Aortic Aneurysm     Brother(s): 3  Gout     Sister(s): 7 Breast Cancer , 2      Paternal Grandmother: Breast Cancer         Social History:     Occupation: [employer]     Marital Status:      Children: 2 children     Review of Systems:  Review of Systems   Constitutional: Negative for fever.   Respiratory: Negative for cough and shortness of breath.    Gastrointestinal: Positive for abdominal pain. Negative for constipation, diarrhea, nausea and vomiting.   Genitourinary: Positive for frequency. Negative for hematuria.        Breast pain right side after wrestling with grand daughter several weeks ago, maybe a few months      nswers for HPI/ROS submitted by the patient on 2022  What is the primary reason for your visit?: Abdominal Pain  Chronicity: new  Onset: 1 to 4 weeks ago  Onset quality: gradual  Frequency: 2 to 4 times per day  Progression since onset: waxing  and waning  Pain location: right flank  Pain - numeric: 5/10  Pain quality: cramping  Radiates to: back, right flank        Current Outpatient Medications:   •  amLODIPine (NORVASC) 5 MG tablet, Take 1.5 tablets by mouth Daily., Disp: 135 tablet, Rfl: 1  •  aspirin 81 MG chewable tablet, Chew 81 mg Daily., Disp: , Rfl:   •  atorvastatin (LIPITOR) 80 MG tablet, Take 80 mg by mouth Daily., Disp: , Rfl:   •  hydroCHLOROthiazide (HYDRODIURIL) 12.5 MG tablet, Take 12.5 mg by mouth Daily., Disp: , Rfl:   •  losartan-hydrochlorothiazide (HYZAAR) 100-12.5 MG per tablet, Take 1 tablet by mouth Daily., Disp: , Rfl:     Vital Signs:   Vitals:    06/14/22 1647 06/14/22 1708   BP: 170/92 169/92   BP Location: Left arm Right arm   Patient Position: Sitting Sitting   Pulse: 92 86   Weight: 104 kg (229 lb)          Physical Exam:  Physical Exam  Constitutional:       Appearance: Normal appearance.   Cardiovascular:      Rate and Rhythm: Normal rate and regular rhythm.      Heart sounds: No murmur heard.  Pulmonary:      Effort: Pulmonary effort is normal.      Breath sounds: Normal breath sounds.   Chest:   Breasts:      Right: Mass (discrete palpable area laterally to nipple) and tenderness present. No nipple discharge or axillary adenopathy.      Left: Normal. No nipple discharge or axillary adenopathy.       Abdominal:      General: Bowel sounds are normal.      Palpations: Abdomen is soft. There is no mass.      Tenderness: There is abdominal tenderness (mild in mathew umbilical area). There is no right CVA tenderness, left CVA tenderness, guarding or rebound.   Lymphadenopathy:      Upper Body:      Right upper body: No axillary adenopathy.      Left upper body: No axillary adenopathy.   Neurological:      Mental Status: He is alert.   Psychiatric:         Mood and Affect: Mood normal.         Behavior: Behavior normal.         Result Review      The following data was reviewed by: BRITTANI Shanks on  06/14/2022:    Results for orders placed or performed in visit on 06/14/22   Urinalysis With Culture If Indicated - Urine, Clean Catch    Specimen: Urine, Clean Catch   Result Value Ref Range    Color, UA Yellow Yellow, Straw    Appearance, UA Clear Clear    pH, UA 6.5 5.0 - 8.0    Specific Gravity, UA >=1.030 1.005 - 1.030    Glucose, UA Negative Negative    Ketones, UA Negative Negative    Bilirubin, UA Negative Negative    Blood, UA Trace (A) Negative    Protein, UA Negative Negative    Leuk Esterase, UA Negative Negative    Nitrite, UA Negative Negative    Urobilinogen, UA 0.2 E.U./dL 0.2 - 1.0 E.U./dL   Urinalysis, Microscopic Only - Urine, Clean Catch    Specimen: Urine, Clean Catch   Result Value Ref Range    RBC, UA 0-2 (A) None Seen /HPF    WBC, UA None Seen None Seen /HPF    Bacteria, UA None Seen None Seen /HPF    Squamous Epithelial Cells, UA None Seen None Seen, 0-2 /HPF    Methodology Automated Microscopy                Assessment and Plan:          Diagnoses and all orders for this visit:    1. Urinary frequency (Primary)  Assessment & Plan:  Drink adequate water, checking u/a and labs     Orders:  -     Cancel: Urinalysis With Culture If Indicated - Urine, Clean Catch  -     Urinalysis With Culture If Indicated - Urine, Clean Catch  -     Urinalysis, Microscopic Only - Urine, Clean Catch    2. Essential hypertension  Assessment & Plan:  Recheck bp, will need to recheck and follow up with PCP, reviewed labs from Roly Tim's office     Orders:  -     Comprehensive metabolic panel; Future    3. Periumbilical abdominal pain  Assessment & Plan:  Checking some labs     Orders:  -     CBC w AUTO Differential; Future  -     Comprehensive metabolic panel; Future    4. Mass of right breast, unspecified quadrant  -     Mammo Diagnostic Digital Tomosynthesis Bilateral With CAD; Future        Follow Up   Return if symptoms worsen or fail to improve, for followup pending lab results.  Patient was given  instructions and counseling regarding his condition or for health maintenance advice. Please see specific information pulled into the AVS if appropriate.

## 2022-06-23 ENCOUNTER — TRANSCRIBE ORDERS (OUTPATIENT)
Dept: ADMINISTRATIVE | Facility: HOSPITAL | Age: 57
End: 2022-06-23

## 2022-06-23 DIAGNOSIS — N63.10 MASS OF RIGHT BREAST, UNSPECIFIED QUADRANT: Primary | ICD-10-CM

## 2022-06-28 ENCOUNTER — TELEPHONE (OUTPATIENT)
Dept: FAMILY MEDICINE CLINIC | Age: 57
End: 2022-06-28

## 2022-06-28 DIAGNOSIS — N63.10 MASS OF RIGHT BREAST, UNSPECIFIED QUADRANT: Primary | ICD-10-CM

## 2022-06-28 NOTE — TELEPHONE ENCOUNTER
Caller: ALEJANDRA- [facility]     Relationship: Other    Best call back number: [phone]    What orders are you requesting (i.e. lab or imaging): ULTRASOUND OF BREAST BILATERAL LIMITED    In what timeframe would the patient need to come in: SCHEDULED FOR 07/01/2022    Where will you receive your lab/imaging services: [facility]    Additional notes: ALEJANDRA WITH [facility] CALLED TO LET US KNOW THAT THEY NEED AN ORDER FOR PATIENT SO HE CAN COME IN FOR HIS SCHEDULED APPOINTMENT ON 07/01/2022 TO GET THE ULTRASOUND OF BREAST BILATERAL LIMITED.

## 2022-07-01 ENCOUNTER — HOSPITAL ENCOUNTER (OUTPATIENT)
Dept: MAMMOGRAPHY | Facility: HOSPITAL | Age: 57
Discharge: HOME | End: 2022-07-01

## 2022-07-01 ENCOUNTER — HOSPITAL ENCOUNTER (OUTPATIENT)
Dept: ULTRASOUND IMAGING | Facility: HOSPITAL | Age: 57
Discharge: HOME | End: 2022-07-01

## 2022-07-01 DIAGNOSIS — N63.10 MASS OF RIGHT BREAST, UNSPECIFIED QUADRANT: ICD-10-CM

## 2022-07-01 PROCEDURE — 77066 DX MAMMO INCL CAD BI: CPT

## 2022-07-01 PROCEDURE — G0279 TOMOSYNTHESIS, MAMMO: HCPCS

## 2022-07-01 PROCEDURE — 76642 ULTRASOUND BREAST LIMITED: CPT

## 2022-07-06 ENCOUNTER — OFFICE VISIT (OUTPATIENT)
Dept: FAMILY MEDICINE CLINIC | Age: 57
End: 2022-07-06

## 2022-07-06 VITALS
WEIGHT: 235 LBS | HEIGHT: 74 IN | DIASTOLIC BLOOD PRESSURE: 84 MMHG | BODY MASS INDEX: 30.16 KG/M2 | OXYGEN SATURATION: 97 % | SYSTOLIC BLOOD PRESSURE: 161 MMHG | HEART RATE: 67 BPM

## 2022-07-06 DIAGNOSIS — S91.209A NAIL AVULSION OF TOE, INITIAL ENCOUNTER: Primary | ICD-10-CM

## 2022-07-06 PROCEDURE — 11730 AVULSION NAIL PLATE SIMPLE 1: CPT | Performed by: FAMILY MEDICINE

## 2022-07-26 PROBLEM — S91.209A NAIL AVULSION OF TOE, INITIAL ENCOUNTER: Status: ACTIVE | Noted: 2022-07-26

## 2022-07-26 PROBLEM — M79.675 PAIN OF TOE OF LEFT FOOT: Status: ACTIVE | Noted: 2022-07-26

## 2022-07-26 NOTE — PROGRESS NOTES
Procedure   Nail Removal    Date/Time: 7/26/2022 3:51 PM  Performed by: Neeraj Acosta MD  Authorized by: Neeraj Acosta MD   Location: left foot  Anesthesia: digital block    Anesthesia:  Local Anesthetic: lidocaine 1% without epinephrine  Anesthetic total: 5 mL    Sedation:  Patient sedated: no    Preparation: skin prepped with Betadine and sterile field established  Amount removed: complete  Wedge excision of skin of nail fold: no  Nail bed sutured: no  Nail matrix removed: partial  Removed nail replaced and anchored: no  Dressing: antibiotic ointment and gauze roll  Patient tolerance: patient tolerated the procedure well with no immediate complications           Answers for HPI/ROS submitted by the patient on 7/6/2022  What is the primary reason for your visit?: Other  Please describe your symptoms.: Thus is s folliw up from prior week test results. But i may need a referreal for a toenail that us falling off and making toe very sore.  Have you had these symptoms before?: No  How long have you been having these symptoms?: 5-7 days  Please list any medications you are currently taking for this condition.: None fir thus. Just soaking in epsom salt

## 2022-07-26 NOTE — PROGRESS NOTES
"Chief Complaint  Follow-up (F/u from US and mammogram of breast ), Toe Pain (Pt states his big toenail on his left foot is hurting ), and Error    Subjective          Magdaleno Pisano presents to [facility]  History of Present Illness  Patient saw Juan Hernandez initially was noted to have a left great toenail partially avulsed.  She requested assistance at nail removal.  She was transferred to my schedule.  He denied any history of allergies    Current Outpatient Medications   Medication Sig Dispense Refill   • amLODIPine (NORVASC) 5 MG tablet Take 1.5 tablets by mouth Daily. 135 tablet 1   • aspirin 81 MG chewable tablet Chew 81 mg Daily.     • atorvastatin (LIPITOR) 80 MG tablet Take 80 mg by mouth Daily.     • hydroCHLOROthiazide (HYDRODIURIL) 12.5 MG tablet Take 12.5 mg by mouth Daily.     • losartan-hydrochlorothiazide (HYZAAR) 100-12.5 MG per tablet Take 1 tablet by mouth Daily.     • vitamin D3 125 MCG (5000 UT) capsule capsule Take 5,000 Units by mouth Daily.       No current facility-administered medications for this visit.       Review of Systems         Objective   Vital Signs:   /84 (BP Location: Right arm, Patient Position: Sitting, Cuff Size: Large Adult)   Pulse 67   Ht 188 cm (74\")   Wt 107 kg (235 lb)   SpO2 97%   BMI 30.17 kg/m²     Physical Exam   Left great toenail is thickened and partially avulsed  Painful to touch  Some purulence near the nail but not much in the way of redness around the nail      Result Review :                     Assessment and Plan    Diagnoses and all orders for this visit:    1. Nail avulsion of toe, initial encounter (Primary)        Follow Up   No follow-ups on file.  Patient was given instructions and counseling regarding his condition or for health maintenance advice. Please see specific information pulled into the AVS if appropriate.         "

## 2022-11-02 ENCOUNTER — OFFICE VISIT (OUTPATIENT)
Dept: FAMILY MEDICINE CLINIC | Age: 57
End: 2022-11-02

## 2022-11-02 VITALS
HEIGHT: 74 IN | WEIGHT: 237.6 LBS | OXYGEN SATURATION: 97 % | HEART RATE: 72 BPM | BODY MASS INDEX: 30.49 KG/M2 | DIASTOLIC BLOOD PRESSURE: 82 MMHG | TEMPERATURE: 98.7 F | SYSTOLIC BLOOD PRESSURE: 144 MMHG

## 2022-11-02 DIAGNOSIS — J01.10 ACUTE NON-RECURRENT FRONTAL SINUSITIS: Primary | ICD-10-CM

## 2022-11-02 DIAGNOSIS — I10 ESSENTIAL HYPERTENSION: ICD-10-CM

## 2022-11-02 DIAGNOSIS — R05.9 COUGH, UNSPECIFIED TYPE: ICD-10-CM

## 2022-11-02 LAB
EXPIRATION DATE: NORMAL
FLUAV AG UPPER RESP QL IA.RAPID: NOT DETECTED
FLUBV AG UPPER RESP QL IA.RAPID: NOT DETECTED
INTERNAL CONTROL: NORMAL
Lab: NORMAL
SARS-COV-2 AG UPPER RESP QL IA.RAPID: NOT DETECTED

## 2022-11-02 PROCEDURE — 87428 SARSCOV & INF VIR A&B AG IA: CPT

## 2022-11-02 PROCEDURE — 99213 OFFICE O/P EST LOW 20 MIN: CPT

## 2022-11-02 RX ORDER — AMOXICILLIN AND CLAVULANATE POTASSIUM 875; 125 MG/1; MG/1
1 TABLET, FILM COATED ORAL 2 TIMES DAILY
Qty: 14 TABLET | Refills: 0 | Status: SHIPPED | OUTPATIENT
Start: 2022-11-02 | End: 2022-11-09

## 2022-11-02 RX ORDER — FLUTICASONE PROPIONATE 50 MCG
2 SPRAY, SUSPENSION (ML) NASAL DAILY
Qty: 15.8 ML | Refills: 0 | Status: SHIPPED | OUTPATIENT
Start: 2022-11-02

## 2022-11-02 RX ORDER — AMOXICILLIN AND CLAVULANATE POTASSIUM 875; 125 MG/1; MG/1
1 TABLET, FILM COATED ORAL 2 TIMES DAILY
Status: CANCELLED | OUTPATIENT
Start: 2022-11-02

## 2022-11-02 NOTE — PROGRESS NOTES
"Subjective     CHIEF COMPLAINT    Chief Complaint   Patient presents with   • Cough     Sinus congestion that started 2 weeks ago     History of Present Illness  Patient is a 57-year-old male who presents to the clinic today with complaints of congestion, cough, sinus pressure.  He does endorse some shortness of breath with exertion but describes it as feeling like he has no energy. He denies any chest pain.  He has not tried any medications at home.  He does report that this usually happens about 1-2 times a year and passes on its own.  Overall his symptoms have been present for about 2 weeks but got worse recently     Review of Systems   Constitutional: Positive for chills. Negative for fever.   HENT: Positive for congestion and sinus pressure.    Respiratory: Positive for cough and shortness of breath (with exertion ). Negative for wheezing.    Cardiovascular: Negative for chest pain.   Musculoskeletal: Negative for myalgias.   Neurological: Positive for headaches (mild).     No Known Allergies    Current Outpatient Medications on File Prior to Visit   Medication Sig Dispense Refill   • amLODIPine (NORVASC) 5 MG tablet Take 1.5 tablets by mouth Daily. 135 tablet 1   • aspirin 81 MG chewable tablet Chew 81 mg Daily.     • atorvastatin (LIPITOR) 80 MG tablet Take 80 mg by mouth Daily.     • hydroCHLOROthiazide (HYDRODIURIL) 12.5 MG tablet Take 12.5 mg by mouth Daily.     • losartan-hydrochlorothiazide (HYZAAR) 100-12.5 MG per tablet Take 1 tablet by mouth Daily.     • vitamin D3 125 MCG (5000 UT) capsule capsule Take 5,000 Units by mouth Daily.       No current facility-administered medications on file prior to visit.       /82 (BP Location: Right arm, Patient Position: Sitting)   Pulse 72   Temp 98.7 °F (37.1 °C) (Oral)   Ht 188 cm (74.02\")   Wt 108 kg (237 lb 9.6 oz)   SpO2 97%   BMI 30.49 kg/m²     Objective     Physical Exam  Vitals and nursing note reviewed.   Constitutional:       General: He is " not in acute distress.     Appearance: Normal appearance. He is not ill-appearing.   HENT:      Head: Normocephalic.      Nose: Congestion present.      Right Sinus: Frontal sinus tenderness present.      Left Sinus: Frontal sinus tenderness present.   Eyes:      Extraocular Movements: Extraocular movements intact.      Pupils: Pupils are equal, round, and reactive to light.   Cardiovascular:      Rate and Rhythm: Normal rate and regular rhythm.      Heart sounds: Normal heart sounds. No murmur heard.  Pulmonary:      Effort: Pulmonary effort is normal. No accessory muscle usage or respiratory distress.      Breath sounds: Normal breath sounds. No decreased breath sounds, wheezing or rhonchi.   Lymphadenopathy:      Cervical: No cervical adenopathy.   Skin:     General: Skin is warm and dry.   Neurological:      General: No focal deficit present.      Mental Status: He is alert and oriented to person, place, and time.   Psychiatric:         Mood and Affect: Mood and affect normal.         Behavior: Behavior normal.          Lab Results (last 24 hours)     Procedure Component Value Units Date/Time    POCT SARS-CoV-2 Antigen GRACIELA + Flu [[id number]] Collected: 11/02/22 1146    Specimen: Swab Updated: 11/02/22 1146     SARS Antigen Not Detected     Influenza A Antigen GRACIELA Not Detected     Influenza B Antigen GRACIELA Not Detected     Internal Control Passed     Lot Number [id number]     Expiration Date 8/2/23             Diagnoses and all orders for this visit:    1. Acute non-recurrent frontal sinusitis (Primary)  Comments:  Recommend Flonase, can also try OTC antihistamine. Antibiotics as noted. Increase fluids, rest.   Orders:  -     fluticasone (Flonase) 50 MCG/ACT nasal spray; 2 sprays into the nostril(s) as directed by provider Daily.  Dispense: 15.8 mL; Refill: 0  -     amoxicillin-clavulanate (Augmentin) 875-125 MG per tablet; Take 1 tablet by mouth 2 (Two) Times a Day for 7 days.  Dispense: 14 tablet; Refill: 0    2.  Essential hypertension  Comments:  Improved on recheck, recommend ambulatory monitoring. Follow up with PCP     3. Cough, unspecified type  Comments:  Recommend OTC mucinex.   Orders:  -     POCT SARS-CoV-2 Antigen GRACIELA + Flu      For any shortness of breath or chest pain, patient was instructed to proceed to the ER. Patient voiced understanding of all instructions and had no further questions at this time.     Return if symptoms worsen or fail to improve, for Next scheduled follow up.    FOR FULL DISCHARGE INSTRUCTIONS/COMMENTS/HANDOUTS please see the AVS